# Patient Record
Sex: MALE | Race: WHITE | NOT HISPANIC OR LATINO | URBAN - METROPOLITAN AREA
[De-identification: names, ages, dates, MRNs, and addresses within clinical notes are randomized per-mention and may not be internally consistent; named-entity substitution may affect disease eponyms.]

---

## 2023-11-28 ENCOUNTER — APPOINTMENT (OUTPATIENT)
Dept: RADIOLOGY | Facility: HOSPITAL | Age: 53
End: 2023-11-28
Payer: COMMERCIAL

## 2023-11-28 ENCOUNTER — HOSPITAL ENCOUNTER (EMERGENCY)
Facility: HOSPITAL | Age: 53
Discharge: HOME | End: 2023-11-28
Payer: COMMERCIAL

## 2023-11-28 VITALS
OXYGEN SATURATION: 99 % | BODY MASS INDEX: 31.84 KG/M2 | RESPIRATION RATE: 16 BRPM | WEIGHT: 215 LBS | SYSTOLIC BLOOD PRESSURE: 139 MMHG | TEMPERATURE: 97.7 F | HEART RATE: 64 BPM | HEIGHT: 69 IN | DIASTOLIC BLOOD PRESSURE: 92 MMHG

## 2023-11-28 DIAGNOSIS — R07.9 CHEST PAIN, UNSPECIFIED TYPE: Primary | ICD-10-CM

## 2023-11-28 LAB
ANION GAP SERPL CALC-SCNC: 10 MMOL/L
BASOPHILS # BLD AUTO: 0.03 X10*3/UL (ref 0–0.1)
BASOPHILS NFR BLD AUTO: 0.4 %
BUN SERPL-MCNC: 11 MG/DL (ref 8–25)
CALCIUM SERPL-MCNC: 9.6 MG/DL (ref 8.5–10.4)
CHLORIDE SERPL-SCNC: 100 MMOL/L (ref 97–107)
CO2 SERPL-SCNC: 26 MMOL/L (ref 24–31)
CREAT SERPL-MCNC: 0.7 MG/DL (ref 0.4–1.6)
EOSINOPHIL # BLD AUTO: 0.06 X10*3/UL (ref 0–0.7)
EOSINOPHIL NFR BLD AUTO: 0.7 %
ERYTHROCYTE [DISTWIDTH] IN BLOOD BY AUTOMATED COUNT: 12.2 % (ref 11.5–14.5)
GFR SERPL CREATININE-BSD FRML MDRD: >90 ML/MIN/1.73M*2
GLUCOSE SERPL-MCNC: 104 MG/DL (ref 65–99)
HCT VFR BLD AUTO: 44.6 % (ref 41–52)
HGB BLD-MCNC: 15.1 G/DL (ref 13.5–17.5)
IMM GRANULOCYTES # BLD AUTO: 0.02 X10*3/UL (ref 0–0.7)
IMM GRANULOCYTES NFR BLD AUTO: 0.2 % (ref 0–0.9)
LYMPHOCYTES # BLD AUTO: 2.23 X10*3/UL (ref 1.2–4.8)
LYMPHOCYTES NFR BLD AUTO: 27.4 %
MCH RBC QN AUTO: 29.3 PG (ref 26–34)
MCHC RBC AUTO-ENTMCNC: 33.9 G/DL (ref 32–36)
MCV RBC AUTO: 87 FL (ref 80–100)
MONOCYTES # BLD AUTO: 0.63 X10*3/UL (ref 0.1–1)
MONOCYTES NFR BLD AUTO: 7.7 %
NEUTROPHILS # BLD AUTO: 5.16 X10*3/UL (ref 1.2–7.7)
NEUTROPHILS NFR BLD AUTO: 63.6 %
NRBC BLD-RTO: 0 /100 WBCS (ref 0–0)
PLATELET # BLD AUTO: 240 X10*3/UL (ref 150–450)
POTASSIUM SERPL-SCNC: 4 MMOL/L (ref 3.4–5.1)
RBC # BLD AUTO: 5.15 X10*6/UL (ref 4.5–5.9)
SODIUM SERPL-SCNC: 136 MMOL/L (ref 133–145)
TROPONIN T SERPL-MCNC: 7 NG/L
TROPONIN T SERPL-MCNC: 8 NG/L
WBC # BLD AUTO: 8.1 X10*3/UL (ref 4.4–11.3)

## 2023-11-28 PROCEDURE — 84484 ASSAY OF TROPONIN QUANT: CPT | Performed by: STUDENT IN AN ORGANIZED HEALTH CARE EDUCATION/TRAINING PROGRAM

## 2023-11-28 PROCEDURE — 82435 ASSAY OF BLOOD CHLORIDE: CPT | Performed by: STUDENT IN AN ORGANIZED HEALTH CARE EDUCATION/TRAINING PROGRAM

## 2023-11-28 PROCEDURE — 99283 EMERGENCY DEPT VISIT LOW MDM: CPT | Mod: 25

## 2023-11-28 PROCEDURE — 85025 COMPLETE CBC W/AUTO DIFF WBC: CPT | Performed by: STUDENT IN AN ORGANIZED HEALTH CARE EDUCATION/TRAINING PROGRAM

## 2023-11-28 PROCEDURE — 36415 COLL VENOUS BLD VENIPUNCTURE: CPT | Performed by: STUDENT IN AN ORGANIZED HEALTH CARE EDUCATION/TRAINING PROGRAM

## 2023-11-28 PROCEDURE — 94760 N-INVAS EAR/PLS OXIMETRY 1: CPT

## 2023-11-28 PROCEDURE — 71045 X-RAY EXAM CHEST 1 VIEW: CPT | Mod: FY

## 2023-11-28 PROCEDURE — 99284 EMERGENCY DEPT VISIT MOD MDM: CPT

## 2023-11-28 RX ORDER — TADALAFIL 5 MG/1
5 TABLET ORAL
COMMUNITY
Start: 2023-10-03

## 2023-11-28 RX ORDER — METFORMIN HYDROCHLORIDE 500 MG/1
1 TABLET, EXTENDED RELEASE ORAL
COMMUNITY
Start: 2023-11-24

## 2023-11-28 RX ORDER — METOPROLOL SUCCINATE 50 MG/1
1 TABLET, EXTENDED RELEASE ORAL DAILY
COMMUNITY
Start: 2022-12-12

## 2023-11-28 RX ORDER — ASCORBIC ACID 500 MG
500 TABLET ORAL
COMMUNITY

## 2023-11-28 RX ORDER — ZOLPIDEM TARTRATE 5 MG/1
5 TABLET ORAL
COMMUNITY
Start: 2023-09-11

## 2023-11-28 RX ORDER — VIT C/E/ZN/COPPR/LUTEIN/ZEAXAN 250MG-90MG
4000 CAPSULE ORAL
COMMUNITY
Start: 2017-03-16

## 2023-11-28 RX ORDER — ALPRAZOLAM 0.5 MG/1
0.5 TABLET ORAL
COMMUNITY
Start: 2023-09-11 | End: 2023-12-10

## 2023-11-28 ASSESSMENT — PAIN DESCRIPTION - LOCATION: LOCATION: CHEST

## 2023-11-28 ASSESSMENT — PAIN DESCRIPTION - DESCRIPTORS: DESCRIPTORS: DULL

## 2023-11-28 ASSESSMENT — PAIN DESCRIPTION - PAIN TYPE: TYPE: ACUTE PAIN

## 2023-11-28 ASSESSMENT — HEART SCORE
HISTORY: MODERATELY SUSPICIOUS
TROPONIN: LESS THAN OR EQUAL TO NORMAL LIMIT
ECG: NORMAL
RISK FACTORS: 1-2 RISK FACTORS
AGE: 45-64
HEART SCORE: 3

## 2023-11-28 ASSESSMENT — COLUMBIA-SUICIDE SEVERITY RATING SCALE - C-SSRS
2. HAVE YOU ACTUALLY HAD ANY THOUGHTS OF KILLING YOURSELF?: NO
6. HAVE YOU EVER DONE ANYTHING, STARTED TO DO ANYTHING, OR PREPARED TO DO ANYTHING TO END YOUR LIFE?: NO
1. IN THE PAST MONTH, HAVE YOU WISHED YOU WERE DEAD OR WISHED YOU COULD GO TO SLEEP AND NOT WAKE UP?: NO

## 2023-11-28 ASSESSMENT — PAIN - FUNCTIONAL ASSESSMENT: PAIN_FUNCTIONAL_ASSESSMENT: 0-10

## 2023-11-28 ASSESSMENT — PAIN SCALES - GENERAL: PAINLEVEL_OUTOF10: 1

## 2023-11-28 NOTE — ED PROVIDER NOTES
Department of Emergency Medicine   ED  Provider Note  Admit Date/RoomTime: 11/28/2023  1:53 PM  ED Room: 19/19        History of Present Illness:  Chief Complaint   Patient presents with    Chest Pain     Left chest and left arm pain intermittent x 1 week, dull in nature, states that pain goes away with excercise         Inocencio Samano is a 53 y.o. male History of hypertension currently on metformin for prediabetes, anxiety,presenting to the ED for ,for  Intermittent chest pain.  Patient reports the pain has been present for about 1 week.  It is a dull discomfort in the left chest and radiates into the left arm with what he describes as a uneasy feeling in his stomach.  Reports he has soreness in his chest from exercising he has no chest pain with exercising and there is no change in the uneasiness in his chest with exercising.  He can run with no shortness of breath.  No change in the symptoms.  He denies any fever chills cough congestion runny nose sore throat no abdominal pain.  Reports he was talking with EMS friends advised him to come to the emergency department to get his troponin checked.  He did take an aspirin today.  Denies any symptoms at this time    Review of Systems:   Pertinent positives and negatives are stated within HPI, all other systems reviewed and are negative.        --------------------------------------------- PAST HISTORY ---------------------------------------------  Past Medical History:  has a past medical history of Anxiety and HTN (hypertension).  Past Surgical History:  has no past surgical history on file.  Social History:    Family History: family history is not on file.. Unless otherwise noted, family history is non contributory  The patient’s home medications have been reviewed.  Allergies: Influenza virus vaccines        ---------------------------------------------------PHYSICAL EXAM--------------------------------------    GENERAL APPEARANCE: Awake and alert.   VITAL SIGNS: As  "per the nurses' triage record.   HEENT: Normocephalic, atraumatic. Extraocular muscles are intact. Conjunctiva are pink. Negative scleral icterus. Mucous membranes are moist. Tongue in the midline. Pharynx was without erythema or exudates, uvula midline  NECK: Soft Nontender and supple, full gross ROM, no meningeal signs.  CHEST: Nontender to palpation. Clear to auscultation bilaterally. No rales, rhonchi, or wheezing.   HEART: S1, S2. Regular rate and rhythm. No murmurs, gallops or rubs.  Strong and equal pulses in the extremities.   ABDOMEN: Soft, nontender, nondistended, positive bowel sounds, no palpable masses.  MUSCULCSKELETAL: The calves are nontender to palpation. Full gross active range of motion. Ambulating on own with no acute difficulties  NEUROLOGICAL: Awake, alert and oriented x 3. Power intact in the upper and lower extremities. Sensation is intact to light touch in the upper and lower extremities.   IMMUNOLOGICAL: No lymphatic streaking noted   DERM: No petechiae, rashes, or ecchymoses.          ------------------------- NURSING NOTES AND VITALS REVIEWED ---------------------------  The nursing notes within the ED encounter and vital signs as below have been reviewed by myself  BP (!) 139/92 (BP Location: Right arm, Patient Position: Sitting)   Pulse 64   Temp 36.5 °C (97.7 °F)   Resp 16   Ht 1.753 m (5' 9\")   Wt 97.5 kg (215 lb)   SpO2 99%   BMI 31.75 kg/m²     Oxygen Saturation Interpretation: Normal    The cardiac monitor revealed Sinus rhythm with a heart rate in the 60s as interpreted by me. The cardiac monitor was ordered secondary to the patient's heart rate and to monitor the patient for dysrhythmia.       The patient’s available past medical records and past encounters were reviewed.          -----------------------DIAGNOSTIC RESULTS------------------------  LABS:    Labs Reviewed   BASIC METABOLIC PANEL - Abnormal       Result Value    Glucose 104 (*)     Sodium 136      Potassium " 4.0      Chloride 100      Bicarbonate 26      Urea Nitrogen 11      Creatinine 0.70      eGFR >90      Calcium 9.6      Anion Gap 10     SERIAL TROPONIN, INITIAL ([facility]) - Normal    Troponin T, High Sensitivity 8     SERIAL TROPONIN,  2 HOUR ([facility]) - Normal    Troponin T, High Sensitivity 7     CBC WITH AUTO DIFFERENTIAL    WBC 8.1      nRBC 0.0      RBC 5.15      Hemoglobin 15.1      Hematocrit 44.6      MCV 87      MCH 29.3      MCHC 33.9      RDW 12.2      Platelets 240      Neutrophils % 63.6      Immature Granulocytes %, Automated 0.2      Lymphocytes % 27.4      Monocytes % 7.7      Eosinophils % 0.7      Basophils % 0.4      Neutrophils Absolute 5.16      Immature Granulocytes Absolute, Automated 0.02      Lymphocytes Absolute 2.23      Monocytes Absolute 0.63      Eosinophils Absolute 0.06      Basophils Absolute 0.03     TROPONIN T SERIES, HIGH SENSITIVITY (0, 2 HR, 6 HR)    Narrative:     The following orders were created for panel order Troponin T Series, High Sensitivity (0, 2HR, 6HR).  Procedure                               Abnormality         Status                     ---------                               -----------         ------                     Serial Troponin, Initial...[[id number]]  Normal              Final result               Serial Troponin, 2 Hour ...[[id number]]  Normal              Final result               Serial Troponin, 6 Hour ...[[id number]]                                                   Please view results for these tests on the individual orders.       As interpreted by me, the above displayed labs are abnormal. All other labs obtained during this visit were within normal range or not returned as of this dictation.      EKG Interpretation    Performed at  1356, HR of 67, NSR, NAD, QTc 412, no sign of STEMI or NSTEMI, no Q wave or T wave abnormality noted per Attending note Dr. Basurto        XR chest 1 view   Final Result   No acute cardiopulmonary disease.        Signed by:  Bryson Davis 11/28/2023 3:19 PM   Dictation workstation:   [id number]              XR chest 1 view   Final Result   No acute cardiopulmonary disease.        Signed by: Bryson Davis 11/28/2023 3:19 PM   Dictation workstation:   [id number]              ------------------------------ ED COURSE/MEDICAL DECISION MAKING----------------------  Medical Decision Making:   Exam: A medically appropriate exam performed, outlined above, given the known history and presentation.    History obtained from: patient       Social Determinants of Health considered during this visit: lives at home       PAST MEDICAL HISTORY/Chronic Conditions Affecting Care     has a past medical history of Anxiety and HTN (hypertension).       CC/HPI Summary, Social Determinants of health, Records Reviewed, DDx, testing done/not done, ED Course, Reassessment, disposition considerations/shared decision making with patient, consults, disposition:   Presents with intermittent chest pain no change with activity associated symptoms of uneasy stomach.  Radiates into the left arm   Plan:   EKG  BMP  CBC  Chest x-ray-No acute cardiopulmonary disease.   Troponin    Medical Decision Making/Differential Diagnosis:  Differentials include but not limited to musculoskeletal versus anxiety versus acute coronary syndrome.   Upon review  Glucose 104  Electrolytes within normal limits  BUN 11 with creatinine 0.7  Troponin 7 repeat troponin 8  White blood cell count 8.1  Hemoglobin 15.1  Patient presented with intermittent chest pain  That does not change with activity onset of symptoms 1 week.  Electrolytes within normal limits.  Normal renal function.  No elevation white blood cell count patient is not anemic.  Chest x-ray showed no acute cardiopulmonary process.  Patient has been working out regularly.  Troponin 7 repeat troponin 8 EKG per attending note no ST elevation or arrhythmia noted.  Patient's heart score is a 3.  Based on patient's clinical  presentation history and symptoms consistent with chest pain.  He is currently chest pain-free.  Would like to go home cannot stay in the hospital.  No signs of distress.  Patient is not tachycardic.  Low suspicion for PE no shortness of breath.  Will give a cardiology referral.  Continue with aspirin daily.  Return emerged part with any worsening symptoms or concerns.  Patient verbalizes understanding  Patient seen and evaluated with attending physician Dr. Tutu OWEN  Unless otherwise noted below, none      CONSULTS:   None      ED Course as of 11/28/23 2308   Tue Nov 28, 2023   1413 Performed at  1356, HR of 67, NSR, NAD, QTc 412, no sign of STEMI or NSTEMI, no Q wave or T wave abnormality noted.    Reviewed and interpreted by me at time performed in triage capacity - Dr Patrick Trujillo   [CEDRICK]      ED Course User Index  [CEDRICK] Mabel Trujillo MD         Diagnoses as of 11/28/23 2308   Chest pain, unspecified type         This patient has remained hemodynamically stable during their ED course.      Critical Care: none       Counseling:  The emergency provider has spoken with the patient and discussed today’s results, in addition to providing specific details for the plan of care and counseling regarding the diagnosis and prognosis.  Questions are answered at this time and they are agreeable with the plan.         --------------------------------- IMPRESSION AND DISPOSITION ---------------------------------    IMPRESSION  1. Chest pain, unspecified type        DISPOSITION  Disposition: Discharge home  Patient condition is stable        NOTE: This report was transcribed using voice recognition software. Every effort was made to ensure accuracy; however, inadvertent computerized transcription errors may be present      Araceli Paige, LALA-GABRIEL  11/28/23 8027

## 2023-11-28 NOTE — DISCHARGE INSTRUCTIONS
Follow-up with primary care physician  Return emerged part any worsening symptoms or concerns  Increase fluids  Continue with a baby aspirin daily

## 2023-11-28 NOTE — ED NOTES
Pt to desk to advise that he cannot stay longer and has to get back to work.  CNP printed his papers and he had no further questions.  Unable to obtain last set of vs as he was fully uniformed with ballistic vest and also wanting to leave without further eval or vs     Andrew Palacios RN  11/28/23 2374

## 2023-11-29 ENCOUNTER — HOSPITAL ENCOUNTER (OUTPATIENT)
Dept: CARDIOLOGY | Facility: HOSPITAL | Age: 53
Discharge: HOME | End: 2023-11-29
Payer: COMMERCIAL

## 2023-11-29 LAB
ATRIAL RATE: 67 BPM
P AXIS: 42 DEGREES
P OFFSET: 203 MS
P ONSET: 147 MS
PR INTERVAL: 152 MS
Q ONSET: 223 MS
QRS COUNT: 11 BEATS
QRS DURATION: 88 MS
QT INTERVAL: 390 MS
QTC CALCULATION(BAZETT): 412 MS
QTC FREDERICIA: 404 MS
R AXIS: 20 DEGREES
T AXIS: 17 DEGREES
T OFFSET: 418 MS
VENTRICULAR RATE: 67 BPM

## 2023-11-29 PROCEDURE — 93005 ELECTROCARDIOGRAM TRACING: CPT

## 2024-06-11 ENCOUNTER — OFFICE VISIT (OUTPATIENT)
Dept: PRIMARY CARE | Facility: CLINIC | Age: 54
End: 2024-06-11
Payer: COMMERCIAL

## 2024-06-11 VITALS
HEIGHT: 69 IN | BODY MASS INDEX: 33.47 KG/M2 | DIASTOLIC BLOOD PRESSURE: 80 MMHG | TEMPERATURE: 97.3 F | SYSTOLIC BLOOD PRESSURE: 134 MMHG | OXYGEN SATURATION: 96 % | HEART RATE: 62 BPM | WEIGHT: 226 LBS

## 2024-06-11 DIAGNOSIS — K21.9 GERD WITHOUT ESOPHAGITIS: ICD-10-CM

## 2024-06-11 DIAGNOSIS — E11.9 TYPE 2 DIABETES MELLITUS WITHOUT COMPLICATION, WITHOUT LONG-TERM CURRENT USE OF INSULIN (MULTI): ICD-10-CM

## 2024-06-11 DIAGNOSIS — E66.09 CLASS 1 OBESITY DUE TO EXCESS CALORIES WITH SERIOUS COMORBIDITY AND BODY MASS INDEX (BMI) OF 33.0 TO 33.9 IN ADULT: Primary | ICD-10-CM

## 2024-06-11 DIAGNOSIS — I10 PRIMARY HYPERTENSION: ICD-10-CM

## 2024-06-11 DIAGNOSIS — E78.2 MIXED HYPERLIPIDEMIA: ICD-10-CM

## 2024-06-11 PROBLEM — K57.32 SIGMOID DIVERTICULITIS: Status: RESOLVED | Noted: 2022-03-23 | Resolved: 2024-06-11

## 2024-06-11 PROBLEM — E66.9 OBESITY, CLASS I, BMI 30-34.9: Status: ACTIVE | Noted: 2022-03-23

## 2024-06-11 PROBLEM — E66.811 OBESITY, CLASS I, BMI 30-34.9: Status: ACTIVE | Noted: 2022-03-23

## 2024-06-11 PROBLEM — K60.2 ANAL FISSURE: Status: RESOLVED | Noted: 2022-06-01 | Resolved: 2024-06-11

## 2024-06-11 PROBLEM — K57.92 DIVERTICULITIS: Status: RESOLVED | Noted: 2021-12-06 | Resolved: 2024-06-11

## 2024-06-11 PROCEDURE — 99205 OFFICE O/P NEW HI 60 MIN: CPT | Performed by: INTERNAL MEDICINE

## 2024-06-11 PROCEDURE — 3008F BODY MASS INDEX DOCD: CPT | Performed by: INTERNAL MEDICINE

## 2024-06-11 PROCEDURE — 3075F SYST BP GE 130 - 139MM HG: CPT | Performed by: INTERNAL MEDICINE

## 2024-06-11 PROCEDURE — 1036F TOBACCO NON-USER: CPT | Performed by: INTERNAL MEDICINE

## 2024-06-11 PROCEDURE — 3079F DIAST BP 80-89 MM HG: CPT | Performed by: INTERNAL MEDICINE

## 2024-06-11 RX ORDER — ATORVASTATIN CALCIUM 10 MG/1
10 TABLET, FILM COATED ORAL DAILY
Qty: 100 TABLET | Refills: 3 | Status: SHIPPED | OUTPATIENT
Start: 2024-06-11 | End: 2025-07-16

## 2024-06-11 RX ORDER — CALC/MAG/B COMPLEX/D3/HERB 61
15 TABLET ORAL
COMMUNITY

## 2024-06-11 NOTE — PROGRESS NOTES
"Subjective   Patient ID: Inocencio Samano is a 53 y.o. male who presents for New Patient Visit.  Previously patient of Dr. Vizcarra, [facility], retiring.  Last seen 9/11/2023.    Past medical history includes NIDDM, hyperlipidemia, CAC of 67.3 (2018), obesity, anxiety and HTN.    Exercises regularly, 3-4 times per week.  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  Does recumbent bike and weights or TM with/without weight intervals.  Lots of core work and stretching.  Eats \"too much\", \"addicted to donuts\", snacks at night; would like to be 195-200, averages around 215lb.  Max of around 237lb.  Has tried vegan diet in the past.    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  One shot most days, social EtOH on weekend.  No tobacco.    Objective   Physical Exam    /80   Pulse 62   Temp 36.3 °C (97.3 °F)   Ht 1.753 m (5' 9\")   Wt 103 kg (226 lb)   SpO2 96%   BMI 33.37 kg/m²      Gen: NAD, pleasant, A&;Ox3  HEENT: PERRL, EOMI, MMM, OP clear  Neck: supple, no thyromegaly, no JVD, normal carotid upstroke  Pulm: lungs CTAB, good air movement  CV: RRR, no m/r/g, 2+ DP pulses  Abd: NABS, soft, NT, ND no HSM  Ext: no peripheral edema  Neuro: CN II-XII intact, no focal sensory or motor deficits, normal reflexes    Assessment/Plan   Extensive discussion w/r/t prevention of ASCVD and weight loss.  Consider intermittent fasting, calorie counting, pharmaceutical.  Wants to hold off on any blood work until he puts in 6 months of lifestyle modifications first.    NIDDM: controlled, with last A1c < 6%  - Metformin 500mg daily  - ACE/ARB?,  Prefers not to add medications at this time  - discussed GLP-1 RA, not interested at this time    Hypertension and hyperlipidemia:  - metoprolol succinate 50mg daily (hx of palpitations, typically emotional response)  - agrees to start back on statin, consider CAC repeat  -Unclear if he needs to stay on a beta-blocker, would consider stopping or weaning off and replacing with ACE inhibitor or " ARB    Insomnia:  -melatonin  -Reports loud snoring and possibly witnessed apneic spells, no symptoms of somnolence, feels rested in the morning, consider sleep study in the future    ED:   - tadalafil 5mg prn, finds it helps with BP    GI: GERD, anal fissure, diverticulosis  - controlled with daily Prevacid, unable to wean off  - normal EGD 4/9/2019    Health maintenance  -Metabolic screening in 6 months  -Last colonoscopy: 2/27/2023, Dr. Quiñonez, normal; Repeat: 5 years  -PSA: 0.48ng/mL  -Smoking history: never  -Counseled regarding diet and exercise  -Immunizations: Appears to be current  -Followup in 6 months    Tom Herndon MD

## 2024-09-06 ENCOUNTER — OFFICE VISIT (OUTPATIENT)
Dept: PRIMARY CARE | Facility: CLINIC | Age: 54
End: 2024-09-06
Payer: COMMERCIAL

## 2024-09-06 ENCOUNTER — HOSPITAL ENCOUNTER (OUTPATIENT)
Dept: RADIOLOGY | Facility: CLINIC | Age: 54
Discharge: HOME | End: 2024-09-06
Payer: COMMERCIAL

## 2024-09-06 VITALS
TEMPERATURE: 97.5 F | BODY MASS INDEX: 33.33 KG/M2 | DIASTOLIC BLOOD PRESSURE: 91 MMHG | WEIGHT: 225 LBS | HEART RATE: 58 BPM | HEIGHT: 69 IN | OXYGEN SATURATION: 97 % | SYSTOLIC BLOOD PRESSURE: 141 MMHG

## 2024-09-06 DIAGNOSIS — J20.9 ACUTE BRONCHITIS, UNSPECIFIED ORGANISM: Primary | ICD-10-CM

## 2024-09-06 DIAGNOSIS — J20.9 ACUTE BRONCHITIS, UNSPECIFIED ORGANISM: ICD-10-CM

## 2024-09-06 PROCEDURE — 99214 OFFICE O/P EST MOD 30 MIN: CPT | Performed by: INTERNAL MEDICINE

## 2024-09-06 PROCEDURE — 71046 X-RAY EXAM CHEST 2 VIEWS: CPT

## 2024-09-06 PROCEDURE — 3008F BODY MASS INDEX DOCD: CPT | Performed by: INTERNAL MEDICINE

## 2024-09-06 PROCEDURE — 3077F SYST BP >= 140 MM HG: CPT | Performed by: INTERNAL MEDICINE

## 2024-09-06 PROCEDURE — 3080F DIAST BP >= 90 MM HG: CPT | Performed by: INTERNAL MEDICINE

## 2024-09-06 PROCEDURE — 1036F TOBACCO NON-USER: CPT | Performed by: INTERNAL MEDICINE

## 2024-09-06 RX ORDER — AZITHROMYCIN 250 MG/1
TABLET, FILM COATED ORAL
Qty: 6 TABLET | Refills: 0 | Status: SHIPPED | OUTPATIENT
Start: 2024-09-06 | End: 2024-09-11

## 2024-09-06 ASSESSMENT — PAIN SCALES - GENERAL: PAINLEVEL: 0-NO PAIN

## 2024-09-16 ENCOUNTER — TELEPHONE (OUTPATIENT)
Dept: PRIMARY CARE | Facility: CLINIC | Age: 54
End: 2024-09-16
Payer: COMMERCIAL

## 2024-09-16 DIAGNOSIS — J20.9 ACUTE BRONCHITIS, UNSPECIFIED ORGANISM: Primary | ICD-10-CM

## 2024-09-16 RX ORDER — AMOXICILLIN AND CLAVULANATE POTASSIUM 875; 125 MG/1; MG/1
875 TABLET, FILM COATED ORAL 2 TIMES DAILY
Qty: 20 TABLET | Refills: 0 | Status: SHIPPED | OUTPATIENT
Start: 2024-09-16 | End: 2024-09-17

## 2024-09-16 NOTE — TELEPHONE ENCOUNTER
The zpak did nothing, having all the same issues with coughing deeply, a lot of mucus.  Do you want to start him on augmentin, come back in (which he does not want), or if you have other options?

## 2024-09-17 ENCOUNTER — TELEPHONE (OUTPATIENT)
Dept: PRIMARY CARE | Facility: CLINIC | Age: 54
End: 2024-09-17
Payer: COMMERCIAL

## 2024-09-17 RX ORDER — AMOXICILLIN AND CLAVULANATE POTASSIUM 875; 125 MG/1; MG/1
875 TABLET, FILM COATED ORAL 2 TIMES DAILY
Qty: 20 TABLET | Refills: 0 | Status: SHIPPED | OUTPATIENT
Start: 2024-09-17 | End: 2024-09-27

## 2024-10-11 ENCOUNTER — APPOINTMENT (OUTPATIENT)
Dept: PRIMARY CARE | Facility: CLINIC | Age: 54
End: 2024-10-11
Payer: COMMERCIAL

## 2024-11-11 ENCOUNTER — OFFICE VISIT (OUTPATIENT)
Dept: PRIMARY CARE | Facility: CLINIC | Age: 54
End: 2024-11-11
Payer: COMMERCIAL

## 2024-11-11 VITALS
HEIGHT: 69 IN | SYSTOLIC BLOOD PRESSURE: 142 MMHG | HEART RATE: 66 BPM | DIASTOLIC BLOOD PRESSURE: 86 MMHG | TEMPERATURE: 96.9 F | WEIGHT: 225 LBS | BODY MASS INDEX: 33.33 KG/M2 | OXYGEN SATURATION: 95 %

## 2024-11-11 DIAGNOSIS — R09.82 POST-NASAL DRIP: ICD-10-CM

## 2024-11-11 DIAGNOSIS — R05.2 SUBACUTE COUGH: ICD-10-CM

## 2024-11-11 DIAGNOSIS — J06.9 VIRAL UPPER RESPIRATORY TRACT INFECTION: Primary | ICD-10-CM

## 2024-11-11 PROCEDURE — 99213 OFFICE O/P EST LOW 20 MIN: CPT | Performed by: INTERNAL MEDICINE

## 2024-11-11 PROCEDURE — 3079F DIAST BP 80-89 MM HG: CPT | Performed by: INTERNAL MEDICINE

## 2024-11-11 PROCEDURE — 1036F TOBACCO NON-USER: CPT | Performed by: INTERNAL MEDICINE

## 2024-11-11 PROCEDURE — 3008F BODY MASS INDEX DOCD: CPT | Performed by: INTERNAL MEDICINE

## 2024-11-11 PROCEDURE — 3077F SYST BP >= 140 MM HG: CPT | Performed by: INTERNAL MEDICINE

## 2024-11-11 NOTE — PATIENT INSTRUCTIONS
Twice daily, in each nostril, spray 1-2 doses of Afrin (oxymetazoline) followed by nasal sinus wash (squeeze bottle or Rekha pot, i.e. NeilMed) followed by 1 spray in each nostril of nasal inhaled corticosteroid (i.e. Flonase, Nasacort, fluticasone, mometasone etc.)  After the first 3 days, STOP Afrin and continue the sinus wash and nasal steroid for at least 1-2 weeks and until symptoms are improved.

## 2024-11-11 NOTE — PROGRESS NOTES
"Subjective   Patient ID: Inocencio Samano is a 54 y.o. male who presents for Sick Visit (PT. Is complaining of cough, runny nose and stuffed ears.).    See visit from 9/6/2024 regarding presentation after 2 weeks of cough with nasal congestion and runny nose.  Had progressed to deeper cough with dyspnea.  Chest x-ray was unremarkable, given his symptoms and agreed to pursue empiric therapy with azithromycin for atypical pneumonia/persistent bronchitis.  He was fairly adamant at the time that this antibiotic does not work for him but agreed.  We also recommended nasal/sinus irrigation and nasal ICS.  He contacted the office on 9/16/2024 asking for Augmentin as he had no improvement in his symptoms and 10-day course was prescribed per his request.  He did send another message on 9/18/2024 that he was having fevers chills and bodyaches despite starting Augmentin.  Contacted the office on 10/7/2024 complaining of improved but persistent symptoms, coughing fits twice a day lasting 5 to 10 minutes.  He was offered an appointment and scheduled for 10/11/2024 but canceled.    Coughing up occasional chunks of heavy, thick mucus.  Possibly got another cold from his son.  Experiencing a lot of congestion, ear pressure, sinus pressure.    Uses psuedoephedrine and oxymetazoline (for 3 days).  No nasal ICS or saline wash.  Guaifenisin caused nausea.    Objective   Physical Exam    /86 (BP Location: Left arm, Patient Position: Sitting, BP Cuff Size: Adult)   Pulse 66   Temp 36.1 °C (96.9 °F) (Temporal)   Ht 1.753 m (5' 9\")   Wt 102 kg (225 lb)   SpO2 95%   BMI 33.23 kg/m²      NAD, non-toxic  Moist cough, some production of sputum  Clear otic canals, serous effusions without erythema or bulging  No adenopathy  Nasal congestion, edema and hyperemia  MMM, no OP lesions  Lungs CTAB  RRR    Assessment/Plan     URI with PND, ETD, cough: recommend 1-2 week trial of sinus regimen; if no improvement, recommend seeing ENT    Tom " MD Katrina

## 2024-12-10 ENCOUNTER — TELEPHONE (OUTPATIENT)
Dept: PRIMARY CARE | Facility: CLINIC | Age: 54
End: 2024-12-10
Payer: COMMERCIAL

## 2024-12-10 DIAGNOSIS — N52.9 ERECTILE DYSFUNCTION, UNSPECIFIED ERECTILE DYSFUNCTION TYPE: ICD-10-CM

## 2024-12-10 DIAGNOSIS — R00.2 PALPITATIONS: ICD-10-CM

## 2024-12-10 DIAGNOSIS — I10 PRIMARY HYPERTENSION: ICD-10-CM

## 2024-12-10 DIAGNOSIS — E11.9 TYPE 2 DIABETES MELLITUS WITHOUT COMPLICATION, WITHOUT LONG-TERM CURRENT USE OF INSULIN (MULTI): Primary | ICD-10-CM

## 2024-12-10 RX ORDER — TADALAFIL 5 MG/1
5 TABLET ORAL DAILY PRN
Qty: 10 TABLET | Refills: 3 | Status: SHIPPED | OUTPATIENT
Start: 2024-12-10 | End: 2024-12-11 | Stop reason: SDUPTHER

## 2024-12-10 RX ORDER — METOPROLOL SUCCINATE 50 MG/1
50 TABLET, EXTENDED RELEASE ORAL DAILY
Qty: 30 TABLET | Refills: 11 | Status: SHIPPED | OUTPATIENT
Start: 2024-12-10 | End: 2025-12-10

## 2024-12-10 RX ORDER — METFORMIN HYDROCHLORIDE 500 MG/1
500 TABLET, EXTENDED RELEASE ORAL
Qty: 30 TABLET | Refills: 11 | Status: SHIPPED | OUTPATIENT
Start: 2024-12-10 | End: 2024-12-11 | Stop reason: SDUPTHER

## 2024-12-11 DIAGNOSIS — E11.9 TYPE 2 DIABETES MELLITUS WITHOUT COMPLICATION, WITHOUT LONG-TERM CURRENT USE OF INSULIN (MULTI): ICD-10-CM

## 2024-12-11 DIAGNOSIS — N52.9 ERECTILE DYSFUNCTION, UNSPECIFIED ERECTILE DYSFUNCTION TYPE: ICD-10-CM

## 2024-12-11 RX ORDER — TADALAFIL 5 MG/1
5 TABLET ORAL DAILY PRN
Qty: 10 TABLET | Refills: 3 | Status: SHIPPED | OUTPATIENT
Start: 2024-12-11

## 2024-12-11 RX ORDER — METFORMIN HYDROCHLORIDE 500 MG/1
500 TABLET, EXTENDED RELEASE ORAL
Qty: 30 TABLET | Refills: 11 | Status: SHIPPED | OUTPATIENT
Start: 2024-12-11 | End: 2025-12-11

## 2024-12-18 ENCOUNTER — APPOINTMENT (OUTPATIENT)
Dept: PRIMARY CARE | Facility: CLINIC | Age: 54
End: 2024-12-18
Payer: COMMERCIAL

## 2024-12-26 ENCOUNTER — TELEPHONE (OUTPATIENT)
Dept: PRIMARY CARE | Facility: CLINIC | Age: 54
End: 2024-12-26
Payer: COMMERCIAL

## 2024-12-26 DIAGNOSIS — R00.2 PALPITATIONS: ICD-10-CM

## 2024-12-26 RX ORDER — METOPROLOL SUCCINATE 50 MG/1
50 TABLET, EXTENDED RELEASE ORAL DAILY
Qty: 90 TABLET | Refills: 0 | Status: SHIPPED | OUTPATIENT
Start: 2024-12-26 | End: 2025-03-26